# Patient Record
Sex: FEMALE | Race: WHITE | Employment: FULL TIME | URBAN - METROPOLITAN AREA
[De-identification: names, ages, dates, MRNs, and addresses within clinical notes are randomized per-mention and may not be internally consistent; named-entity substitution may affect disease eponyms.]

---

## 2017-02-07 ENCOUNTER — OFFICE VISIT (OUTPATIENT)
Dept: OBGYN CLINIC | Age: 22
End: 2017-02-07

## 2017-02-07 VITALS
SYSTOLIC BLOOD PRESSURE: 113 MMHG | WEIGHT: 157 LBS | HEIGHT: 64 IN | HEART RATE: 61 BPM | DIASTOLIC BLOOD PRESSURE: 55 MMHG | BODY MASS INDEX: 26.8 KG/M2

## 2017-02-07 DIAGNOSIS — Z97.5 IUD (INTRAUTERINE DEVICE) IN PLACE: ICD-10-CM

## 2017-02-07 DIAGNOSIS — Z30.431 IUD CHECK UP: Primary | ICD-10-CM

## 2017-02-07 NOTE — PROGRESS NOTES
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  States she had Daysi placed 12/2016. She is here for IUD check. Denies vaginal bleeding or pelvic pain following insertion. No fever, chills, nausea, vomitting. ROS:  General:  Negative for malaise  GI:  Normal bowel habits. No diarrhea or constipation  :  No dysuria, urinary frequency or urgency. No abnormal vaginal discharge. In monogamous relationship x 6 years. No hx STI or abn pap smear. Last pap 12/2016. Past Medical History   Diagnosis Date    Asthma     IUD (intrauterine device) in place 2/7/2017     [facility]. 12/2016. PE:  Visit Vitals    /55    Pulse 61    Ht 5' 4\" (1.626 m)    Wt 157 lb (71.2 kg)    LMP 01/19/2017 (Exact Date)    BMI 26.95 kg/m2     Gen:  NAD  Pelvic:  Normal external genitalia. No inguinal lymphadenopathy. Normal urethra and bladder. Normal vaginal discharge. IUD thread noted through cervical os. Cervix normal without lesions. Uterus normal.  No CMT. No adnexal tenderness/mass. A/P:    ICD-10-CM ICD-9-CM    1. IUD check up Z30.431 V25.42    2. IUD (intrauterine device) in place Z97.5 V45.51     Daysi. 12/2016. No restrictions at this time. Follow up annually/prn. Plan of care discussed. Patient expressed understanding and agreement.

## 2017-05-04 ENCOUNTER — OFFICE VISIT (OUTPATIENT)
Dept: OBGYN CLINIC | Age: 22
End: 2017-05-04

## 2017-05-04 VITALS
BODY MASS INDEX: 26.46 KG/M2 | WEIGHT: 155 LBS | HEIGHT: 64 IN | HEART RATE: 81 BPM | SYSTOLIC BLOOD PRESSURE: 119 MMHG | DIASTOLIC BLOOD PRESSURE: 84 MMHG

## 2017-05-04 DIAGNOSIS — L70.0 ACNE VULGARIS: ICD-10-CM

## 2017-05-04 DIAGNOSIS — Z30.432 ENCOUNTER FOR IUD REMOVAL: Primary | ICD-10-CM

## 2017-05-04 LAB
HCG URINE, QL. (POC): NEGATIVE
VALID INTERNAL CONTROL?: YES

## 2017-05-04 NOTE — PROGRESS NOTES
Patient requesting IUD removal.  States she's never had issues with acne until IUD placed. Acne is diffuce on face, chest and back. No other issues otherwise. Denies AUB. Menses have been short and light. Plans to use condoms for a few months before starting OCP for hormonal regulation and acne. Patient was on OCPs with issues in the past.    Past Medical History:   Diagnosis Date    Asthma     IUD (intrauterine device) in place 2/7/2017    [facility]. 12/2016. Visit Vitals    /84    Pulse 81    Ht 5' 4\" (1.626 m)    Wt 155 lb (70.3 kg)    BMI 26.61 kg/m2     Gen:  NAD. Diffuse acne on forehead, cheeks, and chin. Pelvic:  Normal external genitalia, vagina and cervix. The IUD thread was noted at external cervical os. A/P    ICD-10-CM ICD-9-CM    1. Encounter for IUD removal Z30.432 V25.12 AMB POC URINE PREGNANCY TEST, VISUAL COLOR COMPARISON   2. Acne vulgaris L70.0 706.1      Hormonal options reviewed. Patient opts for monophasic OCP. Advised to call when ready. See procedure note. Plan of care discussed. Patient expressed understanding and agreement.

## 2017-05-04 NOTE — PROCEDURES
Mirena IUD removal (38612):    Keyshawn Molina is a 19 y.o. here for IUD removal due to new onset diffuse acne since alfonso was inserted. Questions/concerns answered. Chart reviewed for the following:  I reviewed patients Ob/Gyn/medical/surgical/meds and allergy history. Today's urine HCG: Negative. TIME OUT performed immediately prior to start of procedure:   I, Duy Morillo DO, have performed the following reviews on Keyshawn Molina prior to the start of the procedure:            * Patient was identified by name and date of birth   * Agreement on procedure being performed was verified  * Risks and Benefits explained to the patient  * Procedure site verified and marked as necessary  * Patient was positioned for comfort    Date of procedure: 5/4/2017    Procedure performed by:  Bernie Dempsey DO    Provider assisted by: James Arizmendi LPN    Patient assisted by: self    How tolerated by patient: tolerated the procedure well with no complications        Procedure:   After informed consent, the patient was placed in dorsal lithotomy position. A lighted speculum was placed. Mucus was wiped with scopette. The IUD thread was noted at external cervical os, grasped with ring forceps and removed intact without difficulty. No bleeding noted. The speculum was removed. The patient tolerated the procedure well. Post procedure expectations and instructions provided. Plan of care discussed. Patient expressed understanding and agreement.         Duy Morillo DO  05/04/17

## 2017-06-01 DIAGNOSIS — L70.0 ACNE VULGARIS: Primary | ICD-10-CM

## 2017-06-01 RX ORDER — NORETHINDRONE ACETATE AND ETHINYL ESTRADIOL 1MG-20(21)
1 KIT ORAL DAILY
Qty: 3 PACKAGE | Refills: 6 | Status: SHIPPED | OUTPATIENT
Start: 2017-06-01 | End: 2017-08-24 | Stop reason: SDUPTHER

## 2017-06-02 ENCOUNTER — HOSPITAL ENCOUNTER (EMERGENCY)
Age: 22
Discharge: HOME | End: 2017-06-02
Attending: EMERGENCY MEDICINE
Payer: COMMERCIAL

## 2017-06-02 VITALS
SYSTOLIC BLOOD PRESSURE: 119 MMHG | WEIGHT: 157 LBS | OXYGEN SATURATION: 100 % | HEART RATE: 60 BPM | TEMPERATURE: 98.1 F | DIASTOLIC BLOOD PRESSURE: 76 MMHG | RESPIRATION RATE: 14 BRPM | HEIGHT: 64 IN | BODY MASS INDEX: 26.8 KG/M2

## 2017-06-02 DIAGNOSIS — N30.01 ACUTE CYSTITIS WITH HEMATURIA: Primary | ICD-10-CM

## 2017-06-02 LAB
APPEARANCE UR: ABNORMAL
BACTERIA URNS QL MICRO: ABNORMAL /HPF
BILIRUB UR QL: NEGATIVE
COLOR UR: YELLOW
EPITH CASTS URNS QL MICRO: ABNORMAL /LPF (ref 0–5)
GLUCOSE UR STRIP.AUTO-MCNC: NEGATIVE MG/DL
HCG UR QL: NEGATIVE
HGB UR QL STRIP: ABNORMAL
KETONES UR QL STRIP.AUTO: NEGATIVE MG/DL
LEUKOCYTE ESTERASE UR QL STRIP.AUTO: ABNORMAL
NITRITE UR QL STRIP.AUTO: NEGATIVE
PH UR STRIP: 5.5 [PH] (ref 5–8)
PROT UR STRIP-MCNC: NEGATIVE MG/DL
RBC #/AREA URNS HPF: ABNORMAL /HPF (ref 0–5)
SP GR UR REFRACTOMETRY: 1.01 (ref 1–1.03)
UROBILINOGEN UR QL STRIP.AUTO: 0.2 EU/DL (ref 0.2–1)
WBC URNS QL MICRO: >50 /HPF (ref 0–4)

## 2017-06-02 PROCEDURE — 81025 URINE PREGNANCY TEST: CPT | Performed by: EMERGENCY MEDICINE

## 2017-06-02 PROCEDURE — 99283 EMERGENCY DEPT VISIT LOW MDM: CPT

## 2017-06-02 PROCEDURE — 81001 URINALYSIS AUTO W/SCOPE: CPT | Performed by: EMERGENCY MEDICINE

## 2017-06-02 RX ORDER — PHENAZOPYRIDINE HYDROCHLORIDE 200 MG/1
200 TABLET, FILM COATED ORAL 3 TIMES DAILY
Qty: 6 TAB | Refills: 0 | Status: SHIPPED | OUTPATIENT
Start: 2017-06-02 | End: 2017-06-04

## 2017-06-02 RX ORDER — NITROFURANTOIN 25; 75 MG/1; MG/1
100 CAPSULE ORAL 2 TIMES DAILY
Qty: 14 CAP | Refills: 0 | Status: SHIPPED | OUTPATIENT
Start: 2017-06-02 | End: 2017-06-09

## 2017-06-02 NOTE — DISCHARGE INSTRUCTIONS
Urinary Tract Infection in Women: Care Instructions  Your Care Instructions    A urinary tract infection, or UTI, is a general term for an infection anywhere between the kidneys and the urethra (where urine comes out). Most UTIs are bladder infections. They often cause pain or burning when you urinate. UTIs are caused by bacteria and can be cured with antibiotics. Be sure to complete your treatment so that the infection goes away. Follow-up care is a key part of your treatment and safety. Be sure to make and go to all appointments, and call your doctor if you are having problems. It's also a good idea to know your test results and keep a list of the medicines you take. How can you care for yourself at home? · Take your antibiotics as directed. Do not stop taking them just because you feel better. You need to take the full course of antibiotics. · Drink extra water and other fluids for the next day or two. This may help wash out the bacteria that are causing the infection. (If you have kidney, heart, or liver disease and have to limit fluids, talk with your doctor before you increase your fluid intake.)  · Avoid drinks that are carbonated or have caffeine. They can irritate the bladder. · Urinate often. Try to empty your bladder each time. · To relieve pain, take a hot bath or lay a heating pad set on low over your lower belly or genital area. Never go to sleep with a heating pad in place. To prevent UTIs  · Drink plenty of water each day. This helps you urinate often, which clears bacteria from your system. (If you have kidney, heart, or liver disease and have to limit fluids, talk with your doctor before you increase your fluid intake.)  · Urinate when you need to. · Urinate right after you have sex. · Change sanitary pads often. · Avoid douches, bubble baths, feminine hygiene sprays, and other feminine hygiene products that have deodorants.   · After going to the bathroom, wipe from front to back.  When should you call for help? Call your doctor now or seek immediate medical care if:  · Symptoms such as fever, chills, nausea, or vomiting get worse or appear for the first time. · You have new pain in your back just below your rib cage. This is called flank pain. · There is new blood or pus in your urine. · You have any problems with your antibiotic medicine. Watch closely for changes in your health, and be sure to contact your doctor if:  · You are not getting better after taking an antibiotic for 2 days. · Your symptoms go away but then come back. Where can you learn more? Go to http://chester-shelley.info/. Enter A208 in the search box to learn more about \"Urinary Tract Infection in Women: Care Instructions. \"  Current as of: November 28, 2016  Content Version: 11.2  © 5270-8124 [employer]. Care instructions adapted under license by [employer] (which disclaims liability or warranty for this information). If you have questions about a medical condition or this instruction, always ask your healthcare professional. [address] any warranty or liability for your use of this information. I have reviewed discharge instructions with the patient. The patient verbalized understanding.

## 2017-06-02 NOTE — ED PROVIDER NOTES
HPI Comments: 4:56 AM Lj Charles is a 19 y.o. female with h/o asthma who presents to ED complaining of dysuria starting two days ago. She is also complaining of urinary frequency and urinary urgency. She states when she drinks a lot of water the symptoms resolve momentarily but then she will go to the bathroom again and have episodes of hematuria. She noticed she passed a blood clot in her urine. LMP was May 10th which she reports was \"sketchy\" because she had an IUD removed. Pt denies vaginal bleed, vaginal discharge, fever, vomiting, nausea, or any other symptoms at this time. PCP: None      No  was used. Past Medical History:   Diagnosis Date    Acne vulgaris 6/1/2017    Asthma     IUD (intrauterine device) in place 2/7/2017    [facility]. 12/2016. Past Surgical History:   Procedure Laterality Date    HX WISDOM TEETH EXTRACTION  7/3/14    REMOVE INTRAUTERINE DEVICE  5/4/2017              Family History:   Problem Relation Age of Onset    Hypertension Mother     Diabetes Mother     Breast Cancer Mother 54     lumpectomy, radiation, BRCA status unknown    Breast Cancer Maternal Grandmother 61       Social History     Social History    Marital status: SINGLE     Spouse name: N/A    Number of children: N/A    Years of education: N/A     Occupational History    Not on file. Social History Main Topics    Smoking status: Never Smoker    Smokeless tobacco: Never Used    Alcohol use 2.0 oz/week     2 Glasses of wine, 2 Shots of liquor per week    Drug use: No    Sexual activity: Yes     Partners: Male     Birth control/ protection: IUD     Other Topics Concern     Service No    Blood Transfusions No    Occupational Exposure No    Exercise Yes    Seat Belt Yes    Self-Exams Yes     Social History Narrative         ALLERGIES: Bactrim [sulfamethoprim ds]    Review of Systems   Constitutional: Negative for chills and fever.    Gastrointestinal: Negative for abdominal pain, nausea and vomiting. Genitourinary: Positive for dysuria, frequency, hematuria, pelvic pain and urgency. Negative for difficulty urinating, vaginal bleeding and vaginal discharge. Musculoskeletal: Negative for back pain. Skin: Negative for rash. All other systems reviewed and are negative. Vitals:    06/02/17 0450   BP: 119/76   Pulse: 60   Resp: 14   Temp: 98.1 °F (36.7 °C)   SpO2: 100%   Weight: 71.2 kg (157 lb)   Height: 5' 4\" (1.626 m)            Physical Exam   Constitutional: She is oriented to person, place, and time. She appears well-developed and well-nourished. HENT:   Head: Normocephalic and atraumatic. Neck: Neck supple. No JVD present. Pulmonary/Chest: No respiratory distress. Abdominal: Soft. She exhibits no distension. There is no tenderness. There is no rebound and no guarding. Musculoskeletal: She exhibits no edema. Neurological: She is alert and oriented to person, place, and time. Skin: Skin is warm and dry. No erythema. Psychiatric: Judgment normal.        MDM  Number of Diagnoses or Management Options  Acute cystitis with hematuria:   Diagnosis management comments: 18 y/o female presents with urinary urgency and hematura    Check ua    Pt well appearing, no flank pain, no distress, doubt stone. Amount and/or Complexity of Data Reviewed  Clinical lab tests: ordered and reviewed      ED Course       Procedures    Vitals:  Patient Vitals for the past 12 hrs:   Temp Pulse Resp BP SpO2   06/02/17 0450 98.1 °F (36.7 °C) 60 14 119/76 100 %           Reevaluation of patient:   Labs consistent with uti. Will treat. PCP follow up   Discussed return precautions. Pt well appearing, no sign of pyelo. Disposition:  Diagnosis:   1.  Acute cystitis with hematuria        Disposition: discharged    Follow-up Information     Follow up With Details Comments Contact Info    your PCP In 2 days      [facility] EMERGENCY DEPT  As needed, If symptoms worsen [address] [place]  [phone]           Patient's Medications   Start Taking    NITROFURANTOIN, MACROCRYSTAL-MONOHYDRATE, (MACROBID) 100 MG CAPSULE    Take 1 Cap by mouth two (2) times a day for 7 days. PHENAZOPYRIDINE (PYRIDIUM) 200 MG TABLET    Take 1 Tab by mouth three (3) times daily for 2 days. Continue Taking    LEVONORGESTREL (KAVITA IU)    by IntraUTERine route. December 2016    NORETHINDRONE-ETHINYL ESTRADIOL (JUNEL FE 1/20) 1 MG-20 MCG (21)/75 MG (7) TAB    Take 1 Tab by mouth daily. These Medications have changed    No medications on file   Stop Taking    No medications on file     Scribe Attestation  Shantal Roca acting as a scribe for and in the presence of Nelly Mathew DO  June 02, 2017 at 6:43 AM       Provider Attestation:  I personally performed the services described in the documentation, reviewed the documentation, as recorded by the scribe in my presence, and it accurately and completely records my words and actions.   Nelly Mathew DO      Signed by: Shantal Arauz, June 02, 2017 at 6:43 AM

## 2017-08-24 DIAGNOSIS — L70.0 ACNE VULGARIS: ICD-10-CM

## 2017-08-25 RX ORDER — NORETHINDRONE ACETATE AND ETHINYL ESTRADIOL 1MG-20(21)
1 KIT ORAL DAILY
Qty: 3 PACKAGE | Refills: 6 | Status: SHIPPED | OUTPATIENT
Start: 2017-08-25